# Patient Record
Sex: MALE | Race: WHITE | NOT HISPANIC OR LATINO | URBAN - METROPOLITAN AREA
[De-identification: names, ages, dates, MRNs, and addresses within clinical notes are randomized per-mention and may not be internally consistent; named-entity substitution may affect disease eponyms.]

---

## 2024-11-07 ENCOUNTER — HOSPITAL ENCOUNTER (EMERGENCY)
Facility: HOSPITAL | Age: 29
Discharge: HOME | End: 2024-11-07
Attending: EMERGENCY MEDICINE
Payer: OTHER GOVERNMENT

## 2024-11-07 VITALS
DIASTOLIC BLOOD PRESSURE: 73 MMHG | SYSTOLIC BLOOD PRESSURE: 115 MMHG | TEMPERATURE: 98 F | HEART RATE: 68 BPM | OXYGEN SATURATION: 98 % | HEIGHT: 67 IN | RESPIRATION RATE: 18 BRPM | WEIGHT: 140 LBS | BODY MASS INDEX: 21.97 KG/M2

## 2024-11-07 DIAGNOSIS — G43.909 MIGRAINE WITHOUT STATUS MIGRAINOSUS, NOT INTRACTABLE, UNSPECIFIED MIGRAINE TYPE: Primary | ICD-10-CM

## 2024-11-07 PROCEDURE — 63600175 PHARM REV CODE 636 W HCPCS: Performed by: EMERGENCY MEDICINE

## 2024-11-07 PROCEDURE — 99284 EMERGENCY DEPT VISIT MOD MDM: CPT | Mod: 25

## 2024-11-07 PROCEDURE — 96372 THER/PROPH/DIAG INJ SC/IM: CPT | Performed by: EMERGENCY MEDICINE

## 2024-11-07 RX ORDER — SUMATRIPTAN 6 MG/.5ML
6 INJECTION, SOLUTION SUBCUTANEOUS
Status: COMPLETED | OUTPATIENT
Start: 2024-11-07 | End: 2024-11-07

## 2024-11-07 RX ORDER — ZOLMITRIPTAN 2.5 MG/1
2.5 TABLET, FILM COATED ORAL
Qty: 10 TABLET | Refills: 1 | Status: SHIPPED | OUTPATIENT
Start: 2024-11-07 | End: 2024-12-07

## 2024-11-07 RX ADMIN — SUMATRIPTAN 6 MG: 6 INJECTION, SOLUTION SUBCUTANEOUS at 01:11

## 2024-11-07 NOTE — ED NOTES
"Patient came to ER with complaint of numbness and pressure behind left eye that started "a few months ago". Patient stated it has been years since his last optometry visit but has an upcoming appointment in January. Patient alert and oriented x 4, ambulates without assistance.   "

## 2024-11-07 NOTE — ED PROVIDER NOTES
Encounter Date: 11/7/2024       History     Chief Complaint   Patient presents with    Eye Pain     Complains of left eye pain, started 4 months ago and is worse today. Denies blurred vision.      Chief complaint: Headache    HPI: 29-year-old male presents with a 4 month history of intermittent periorbital numbness with associated left frontal headache.  He reports that the symptoms are present intermittently and have been persistent for the past 8 hours.  He denies any visual or speech impairment and has no weakness or numbness.  He denies any saulo ocular pain.      Review of patient's allergies indicates:  No Known Allergies  History reviewed. No pertinent past medical history.  History reviewed. No pertinent surgical history.  No family history on file.     Review of Systems   Constitutional:  Negative for fever.   Eyes:  Negative for photophobia and visual disturbance.   Respiratory:  Negative for apnea and shortness of breath.    Cardiovascular:  Negative for chest pain and palpitations.   Gastrointestinal:  Positive for nausea. Negative for abdominal distention, abdominal pain and vomiting.   Genitourinary:  Negative for difficulty urinating.   Musculoskeletal:  Negative for neck pain.   Skin:  Negative for pallor and rash.   Neurological:  Positive for numbness and headaches. Negative for facial asymmetry and weakness.   Hematological:  Does not bruise/bleed easily.   Psychiatric/Behavioral:  Negative for agitation.        Physical Exam     Initial Vitals [11/07/24 0101]   BP Pulse Resp Temp SpO2   122/76 72 18 97.9 °F (36.6 °C) 97 %      MAP       --         Physical Exam    Nursing note and vitals reviewed.  Constitutional: He appears well-developed and well-nourished.   HENT:   Head: Normocephalic and atraumatic.   Eyes: Conjunctivae are normal.   Neck: Neck supple.   Normal range of motion.  Cardiovascular:  Normal rate, regular rhythm and normal heart sounds.           Pulmonary/Chest: Breath sounds  normal. No respiratory distress.   Abdominal: He exhibits no distension.   Musculoskeletal:         General: Normal range of motion.      Cervical back: Normal range of motion and neck supple.     Neurological: He is alert and oriented to person, place, and time. He has normal strength. GCS score is 15. GCS eye subscore is 4. GCS verbal subscore is 5. GCS motor subscore is 6.   Skin: Skin is warm and dry.   Psychiatric: He has a normal mood and affect.         ED Course   Procedures  Labs Reviewed - No data to display       Imaging Results    None          Medications   SUMAtriptan succinate injection 6 mg (6 mg Subcutaneous Given 11/7/24 0153)     Medical Decision Making  29-year-old male presents with a 4 month history of intermittent periorbital numbness with associated left frontal headache.  The symptoms suggest a migraine headache.  He is given Imitrex with symptomatic improvement.  He has no focal neurologic deficits intracranial mass or hemorrhage unlikely.  He has no ocular symptoms with glaucoma corneal abrasion unlikely.  He is referred to Neurology for further evaluation of suspected migraine headaches.    Risk  Prescription drug management.                                      Clinical Impression:  Final diagnoses:  [G43.909] Migraine without status migrainosus, not intractable, unspecified migraine type (Primary)          ED Disposition Condition    Discharge Stable          ED Prescriptions       Medication Sig Dispense Start Date End Date Auth. Provider    ZOLMitriptan (ZOMIG) 2.5 mg tablet Take 1 tablet (2.5 mg total) by mouth as needed for Migraine. 10 tablet 11/7/2024 12/7/2024 Cale Church III, MD          Follow-up Information       Follow up With Specialties Details Why Contact Info    Humberto Miranda MD Vascular Neurology, Neurology In 5 days  [address] [facility] [id number]  [phone]               Cale Church III, MD  11/07/24 0227